# Patient Record
Sex: FEMALE | Race: WHITE | NOT HISPANIC OR LATINO | Employment: FULL TIME | URBAN - METROPOLITAN AREA
[De-identification: names, ages, dates, MRNs, and addresses within clinical notes are randomized per-mention and may not be internally consistent; named-entity substitution may affect disease eponyms.]

---

## 2022-06-29 ENCOUNTER — OFFICE VISIT (OUTPATIENT)
Dept: FAMILY MEDICINE | Facility: CLINIC | Age: 52
End: 2022-06-29
Payer: COMMERCIAL

## 2022-06-29 VITALS
HEART RATE: 60 BPM | TEMPERATURE: 98.1 F | SYSTOLIC BLOOD PRESSURE: 117 MMHG | BODY MASS INDEX: 23.82 KG/M2 | OXYGEN SATURATION: 100 % | HEIGHT: 65 IN | DIASTOLIC BLOOD PRESSURE: 74 MMHG | WEIGHT: 143 LBS

## 2022-06-29 DIAGNOSIS — F41.1 GAD (GENERALIZED ANXIETY DISORDER): Primary | ICD-10-CM

## 2022-06-29 PROCEDURE — 99203 OFFICE O/P NEW LOW 30 MIN: CPT | Performed by: PHYSICIAN ASSISTANT

## 2022-06-29 RX ORDER — CITALOPRAM HYDROBROMIDE 20 MG/1
TABLET ORAL
COMMUNITY
Start: 2021-12-22 | End: 2022-06-29

## 2022-06-29 RX ORDER — CITALOPRAM HYDROBROMIDE 20 MG/1
40 TABLET ORAL DAILY
Qty: 180 TABLET | Refills: 1 | Status: SHIPPED | OUTPATIENT
Start: 2022-06-29 | End: 2022-10-05

## 2022-06-29 ASSESSMENT — ANXIETY QUESTIONNAIRES
4. TROUBLE RELAXING: SEVERAL DAYS
6. BECOMING EASILY ANNOYED OR IRRITABLE: NOT AT ALL
GAD7 TOTAL SCORE: 5
8. IF YOU CHECKED OFF ANY PROBLEMS, HOW DIFFICULT HAVE THESE MADE IT FOR YOU TO DO YOUR WORK, TAKE CARE OF THINGS AT HOME, OR GET ALONG WITH OTHER PEOPLE?: SOMEWHAT DIFFICULT
1. FEELING NERVOUS, ANXIOUS, OR ON EDGE: SEVERAL DAYS
5. BEING SO RESTLESS THAT IT IS HARD TO SIT STILL: NOT AT ALL
7. FEELING AFRAID AS IF SOMETHING AWFUL MIGHT HAPPEN: SEVERAL DAYS
GAD7 TOTAL SCORE: 5
2. NOT BEING ABLE TO STOP OR CONTROL WORRYING: SEVERAL DAYS
3. WORRYING TOO MUCH ABOUT DIFFERENT THINGS: SEVERAL DAYS
7. FEELING AFRAID AS IF SOMETHING AWFUL MIGHT HAPPEN: SEVERAL DAYS
GAD7 TOTAL SCORE: 5

## 2022-06-29 ASSESSMENT — PATIENT HEALTH QUESTIONNAIRE - PHQ9
10. IF YOU CHECKED OFF ANY PROBLEMS, HOW DIFFICULT HAVE THESE PROBLEMS MADE IT FOR YOU TO DO YOUR WORK, TAKE CARE OF THINGS AT HOME, OR GET ALONG WITH OTHER PEOPLE: SOMEWHAT DIFFICULT
SUM OF ALL RESPONSES TO PHQ QUESTIONS 1-9: 7
SUM OF ALL RESPONSES TO PHQ QUESTIONS 1-9: 7

## 2022-06-29 ASSESSMENT — PAIN SCALES - GENERAL: PAINLEVEL: NO PAIN (0)

## 2022-06-29 NOTE — PROGRESS NOTES
Assessment & Plan     TRESSA (generalized anxiety disorder)  Adjust her citalopram dose to 40 mg daily.   She will plan to establish care with a closer clinic within the next 90 days.   Continue with counseling.   - citalopram (CELEXA) 20 MG tablet; Take 2 tablets (40 mg) by mouth daily                 Return in about 3 months (around 9/29/2022) for Physical Exam.    Kristen M. Kehr, PA-C  [facility]    [place]scar   Leonela is a 51 year old, presenting for the following health issues:  Anxiety and Depression    Leonela has been a patient at [facility]. Her insurance changed.   She is going to be establishing care with a provider in [facility], but unable to get an appointment. She will be out of medication. She has been taking citalopram 30 mg daily. She feels it has been a good medication for her, but there has been some increase in symptoms recently with the death in the family and working through marital problems. She is going to counseling with her .   She is considering dose changes with her medication.     History of Present Illness       Mental Health Follow-up:  Patient presents to follow-up on Depression & Anxiety.Patient's depression since last visit has been:  Medium  The patient is not having other symptoms associated with depression.  Patient's anxiety since last visit has been:  Worse  The patient is having other symptoms associated with anxiety.  Any significant life events: relationship concerns and grief or loss  Patient is feeling anxious or having panic attacks.  Patient has no concerns about alcohol or drug use.    She eats 2-3 servings of fruits and vegetables daily.She consumes 1 sweetened beverage(s) daily.She exercises with enough effort to increase her heart rate 20 to 29 minutes per day.  She exercises with enough effort to increase her heart rate 3 or less days per week.   She is taking medications regularly.    Today's PHQ-9         PHQ-9 Total  "Score: 7    PHQ-9 Q9 Thoughts of better off dead/self-harm past 2 weeks :   Not at all    How difficult have these problems made it for you to do your work, take care of things at home, or get along with other people: Somewhat difficult  Today's TRESSA-7 Score: 5             Review of Systems   Constitutional, HEENT, cardiovascular, pulmonary, GI, , musculoskeletal, neuro, skin, endocrine and psych systems are negative, except as otherwise noted.      Objective    /74   Pulse 60   Temp 98.1  F (36.7  C) (Tympanic)   Ht 1.657 m (5' 5.25\")   Wt 64.9 kg (143 lb)   SpO2 100%   BMI 23.61 kg/m    Body mass index is 23.61 kg/m .  Physical Exam   GENERAL: healthy, alert and no distress  PSYCH: mentation appears normal, affect normal/bright and judgement and insight intact                    .  ..  "

## 2022-06-29 NOTE — NURSING NOTE
"Chief Complaint   Patient presents with     Anxiety     Depression       Initial /74   Pulse 60   Temp 98.1  F (36.7  C) (Tympanic)   Ht 1.657 m (5' 5.25\")   Wt 64.9 kg (143 lb)   SpO2 100%   BMI 23.61 kg/m   Estimated body mass index is 23.61 kg/m  as calculated from the following:    Height as of this encounter: 1.657 m (5' 5.25\").    Weight as of this encounter: 64.9 kg (143 lb).  Medication Reconciliation: complete    GONZALES Everett MA    "

## 2022-10-04 PROBLEM — F41.9 ANXIETY: Status: ACTIVE | Noted: 2018-02-02

## 2022-10-04 PROBLEM — L30.9 CHRONIC ECZEMA: Status: ACTIVE | Noted: 2020-06-10

## 2022-10-05 ENCOUNTER — OFFICE VISIT (OUTPATIENT)
Dept: FAMILY MEDICINE | Facility: CLINIC | Age: 52
End: 2022-10-05
Payer: COMMERCIAL

## 2022-10-05 VITALS
SYSTOLIC BLOOD PRESSURE: 126 MMHG | BODY MASS INDEX: 24.62 KG/M2 | DIASTOLIC BLOOD PRESSURE: 68 MMHG | WEIGHT: 147.8 LBS | HEIGHT: 65 IN | HEART RATE: 49 BPM | OXYGEN SATURATION: 100 % | RESPIRATION RATE: 12 BRPM

## 2022-10-05 DIAGNOSIS — Z12.4 CERVICAL CANCER SCREENING: ICD-10-CM

## 2022-10-05 DIAGNOSIS — Z11.59 NEED FOR HEPATITIS C SCREENING TEST: ICD-10-CM

## 2022-10-05 DIAGNOSIS — Z12.5 SCREENING FOR PROSTATE CANCER: ICD-10-CM

## 2022-10-05 DIAGNOSIS — F41.1 GAD (GENERALIZED ANXIETY DISORDER): ICD-10-CM

## 2022-10-05 DIAGNOSIS — Z12.11 SCREEN FOR COLON CANCER: ICD-10-CM

## 2022-10-05 DIAGNOSIS — Z00.00 ANNUAL PHYSICAL EXAM: Primary | ICD-10-CM

## 2022-10-05 DIAGNOSIS — Z11.4 SCREENING FOR HIV (HUMAN IMMUNODEFICIENCY VIRUS): ICD-10-CM

## 2022-10-05 DIAGNOSIS — Z00.00 ROUTINE GENERAL MEDICAL EXAMINATION AT A HEALTH CARE FACILITY: ICD-10-CM

## 2022-10-05 DIAGNOSIS — E04.1 THYROID NODULE: ICD-10-CM

## 2022-10-05 DIAGNOSIS — F41.9 ANXIETY: ICD-10-CM

## 2022-10-05 DIAGNOSIS — Z12.31 VISIT FOR SCREENING MAMMOGRAM: ICD-10-CM

## 2022-10-05 PROCEDURE — 99396 PREV VISIT EST AGE 40-64: CPT | Mod: 25 | Performed by: PHYSICIAN ASSISTANT

## 2022-10-05 PROCEDURE — 99214 OFFICE O/P EST MOD 30 MIN: CPT | Mod: 25 | Performed by: PHYSICIAN ASSISTANT

## 2022-10-05 PROCEDURE — 90471 IMMUNIZATION ADMIN: CPT | Performed by: PHYSICIAN ASSISTANT

## 2022-10-05 PROCEDURE — 90715 TDAP VACCINE 7 YRS/> IM: CPT | Performed by: PHYSICIAN ASSISTANT

## 2022-10-05 RX ORDER — CITALOPRAM HYDROBROMIDE 40 MG/1
40 TABLET ORAL DAILY
Qty: 90 TABLET | Refills: 1 | Status: SHIPPED | OUTPATIENT
Start: 2022-10-05 | End: 2023-06-27

## 2022-10-05 ASSESSMENT — ENCOUNTER SYMPTOMS
PALPITATIONS: 0
HEMATOCHEZIA: 0
COUGH: 0
SORE THROAT: 0
DIARRHEA: 0
ABDOMINAL PAIN: 0
NAUSEA: 0
MYALGIAS: 0
HEMATURIA: 0
JOINT SWELLING: 0
EYE PAIN: 0
FEVER: 0
NERVOUS/ANXIOUS: 0
BREAST MASS: 0
HEARTBURN: 0
WEAKNESS: 0
PARESTHESIAS: 0
SHORTNESS OF BREATH: 0
CHILLS: 0
DYSURIA: 0
DIZZINESS: 0
FREQUENCY: 0
CONSTIPATION: 0
HEADACHES: 0
ARTHRALGIAS: 0

## 2022-10-05 ASSESSMENT — PAIN SCALES - GENERAL: PAINLEVEL: NO PAIN (0)

## 2022-10-05 NOTE — PATIENT INSTRUCTIONS
Please call the radiology dep at [facility] to schedule your test today at [phone]        Preventive Health Recommendations  Female Ages 50 - 64    Yearly exam: See your health care provider every year in order to  Review health changes.   Discuss preventive care.    Review your medicines if your doctor has prescribed any.    Get a Pap test every three years (unless you have an abnormal result and your provider advises testing more often).  If you get Pap tests with HPV test, you only need to test every 5 years, unless you have an abnormal result.   You do not need a Pap test if your uterus was removed (hysterectomy) and you have not had cancer.  You should be tested each year for STDs (sexually transmitted diseases) if you're at risk.   Have a mammogram every 1 to 2 years.  Have a colonoscopy at age 50, or have a yearly FIT test (stool test). These exams screen for colon cancer.    Have a cholesterol test every 5 years, or more often if advised.  Have a diabetes test (fasting glucose) every three years. If you are at risk for diabetes, you should have this test more often.   If you are at risk for osteoporosis (brittle bone disease), think about having a bone density scan (DEXA).    Shots: Get a flu shot each year. Get a tetanus shot every 10 years.    Nutrition:   Eat at least 5 servings of fruits and vegetables each day.  Eat whole-grain bread, whole-wheat pasta and brown rice instead of white grains and rice.  Get adequate Calcium and Vitamin D.     Lifestyle  Exercise at least 150 minutes a week (30 minutes a day, 5 days a week). This will help you control your weight and prevent disease.  Limit alcohol to one drink per day.  No smoking.   Wear sunscreen to prevent skin cancer.   See your dentist every six months for an exam and cleaning.  See your eye doctor every 1 to 2 years.

## 2022-10-10 ENCOUNTER — TELEPHONE (OUTPATIENT)
Dept: FAMILY MEDICINE | Facility: CLINIC | Age: 52
End: 2022-10-10

## 2022-10-10 DIAGNOSIS — L30.9 CHRONIC ECZEMA: Primary | ICD-10-CM

## 2022-10-10 RX ORDER — BETAMETHASONE DIPROPIONATE 0.5 MG/G
CREAM TOPICAL 2 TIMES DAILY
Qty: 45 G | Refills: 1 | Status: SHIPPED | OUTPATIENT
Start: 2022-10-10

## 2022-10-10 NOTE — TELEPHONE ENCOUNTER
Reason for Call:  Other call back    Detailed comments: PT CALLING BACK WITH INFORMATION ON THE CREAM / BETAMETHASONE DIPROPIONATE  05%   WOULD LIKE THIS CALLED INTO HER PHARMACY     Phone Number Patient can be reached at:Cell number on file:    Telephone Information:   Mobile [phone]       Best Time: ANYTIME    Can we leave a detailed message on this number? YES    Call taken on 10/10/2022 at 9:29 AM by Tom Rudd

## 2023-02-12 ENCOUNTER — HEALTH MAINTENANCE LETTER (OUTPATIENT)
Age: 53
End: 2023-02-12

## 2023-06-27 ENCOUNTER — NURSE TRIAGE (OUTPATIENT)
Dept: NURSING | Facility: CLINIC | Age: 53
End: 2023-06-27
Payer: COMMERCIAL

## 2023-06-27 DIAGNOSIS — F41.1 GAD (GENERALIZED ANXIETY DISORDER): ICD-10-CM

## 2023-06-27 NOTE — TELEPHONE ENCOUNTER
Pt calling to request citalopram refill.  Has not contacted her pharmacy.  Separate refill encounter therefore created now.  See separate refill encounter processed per RN refill protocols.    Jenny BUNDY Health Nurse Advisor     Reason for Disposition    Caller requesting a prescription renewal (no refills left), no triage required, and triager able to renew prescription per department policy    Protocols used: MEDICATION REFILL AND RENEWAL CALL-A-OH

## 2023-06-27 NOTE — TELEPHONE ENCOUNTER
"Refill request routed to [facility] since Dr Ybarra was removed from pt's chart as the PCP.  Pt has upcoming appt 7/20/2023 for Med Check w/Dr Infante.  Rx currently depleted.  \"Has been using her son's Rx Celexa supply (same dose).\"    Last Written Prescription Date:  10/5/2022  Last Fill Quantity: 90,  # refills: 1   Last office visit provider:  10/5/2022     Requested Prescriptions   Pending Prescriptions Disp Refills     citalopram (CELEXA) 40 MG tablet 90 tablet 1     Sig: Take 1 tablet (40 mg) by mouth daily       SSRIs Protocol Passed - 6/27/2023  2:51 PM        Passed - Recent (12 mo) or future (30 days) visit within the authorizing provider's specialty     Patient has had an office visit with the authorizing provider or a provider within the authorizing providers department within the previous 12 mos or has a future within next 30 days. See \"Patient Info\" tab in inbasket, or \"Choose Columns\" in Meds & Orders section of the refill encounter.              Passed - Medication is active on med list        Passed - Patient is age 18 or older        Passed - No active pregnancy on record        Passed - No positive pregnancy test in last 12 months             Stephany Marr RN 06/27/23 2:51 PM  "

## 2023-06-28 RX ORDER — CITALOPRAM HYDROBROMIDE 40 MG/1
40 TABLET ORAL DAILY
Qty: 90 TABLET | Refills: 0 | Status: SHIPPED | OUTPATIENT
Start: 2023-06-28 | End: 2023-07-20

## 2023-06-29 DIAGNOSIS — F41.1 GAD (GENERALIZED ANXIETY DISORDER): ICD-10-CM

## 2023-06-29 RX ORDER — CITALOPRAM HYDROBROMIDE 40 MG/1
TABLET ORAL
Qty: 90 TABLET | Refills: 0 | OUTPATIENT
Start: 2023-06-29

## 2023-07-20 ENCOUNTER — VIRTUAL VISIT (OUTPATIENT)
Dept: FAMILY MEDICINE | Facility: CLINIC | Age: 53
End: 2023-07-20
Payer: COMMERCIAL

## 2023-07-20 DIAGNOSIS — F41.1 GAD (GENERALIZED ANXIETY DISORDER): Primary | ICD-10-CM

## 2023-07-20 DIAGNOSIS — Z12.11 SCREEN FOR COLON CANCER: ICD-10-CM

## 2023-07-20 DIAGNOSIS — Z12.31 ENCOUNTER FOR SCREENING MAMMOGRAM FOR BREAST CANCER: ICD-10-CM

## 2023-07-20 PROCEDURE — 99213 OFFICE O/P EST LOW 20 MIN: CPT | Mod: VID | Performed by: FAMILY MEDICINE

## 2023-07-20 RX ORDER — CITALOPRAM HYDROBROMIDE 40 MG/1
40 TABLET ORAL DAILY
Qty: 90 TABLET | Refills: 3 | Status: SHIPPED | OUTPATIENT
Start: 2023-07-20 | End: 2024-08-05

## 2023-07-20 NOTE — PROGRESS NOTES
Leonela is a 52 year old who is being evaluated via a billable video visit.      How would you like to obtain your AVS? MyChart  If the video visit is dropped, the invitation should be resent by: Text to cell phone: [phone]  Will anyone else be joining your video visit? No          Assessment & Plan   Problem List Items Addressed This Visit    None  Visit Diagnoses       TRESSA (generalized anxiety disorder)    -  Primary    Relevant Medications    citalopram (CELEXA) 40 MG tablet    Screen for colon cancer        Relevant Orders    Colonoscopy Screening  Referral    Encounter for screening mammogram for breast cancer        Relevant Orders    *MA Screening Digital Bilateral                          KARINA PITTMAN MD  [facility]    Subjective   Leonela is a 52 year old, presenting for the following health issues:  Recheck Medication        7/20/2023     7:22 AM   Additional Questions   Roomed by allan lester cma     HPI      On Citalopram, overall doing well.  Sleep is okay.  See TRESSA-7      Review of Systems   Constitutional, HEENT, cardiovascular, pulmonary, gi and gu systems are negative, except as otherwise noted.      Objective           Vitals:  No vitals were obtained today due to virtual visit.    Physical Exam   GENERAL: Healthy, alert and no distress  EYES: Eyes grossly normal to inspection.  No discharge or erythema, or obvious scleral/conjunctival abnormalities.  RESP: No audible wheeze, cough, or visible cyanosis.  No visible retractions or increased work of breathing.    SKIN: Visible skin clear. No significant rash, abnormal pigmentation or lesions.  NEURO: Cranial nerves grossly intact.  Mentation and speech appropriate for age.  PSYCH: Mentation appears normal, affect normal/bright, judgement and insight intact, normal speech and appearance well-groomed.                Video-Visit Details    Type of service:  Video Visit   7:37-7:45am    Originating Location (pt.  Location): Home    Distant Location (provider location):  On-site  Platform used for Video Visit: Stephanie

## 2023-10-04 ENCOUNTER — OFFICE VISIT (OUTPATIENT)
Dept: INTERNAL MEDICINE | Facility: CLINIC | Age: 53
End: 2023-10-04
Payer: COMMERCIAL

## 2023-10-04 ENCOUNTER — NURSE TRIAGE (OUTPATIENT)
Dept: NURSING | Facility: CLINIC | Age: 53
End: 2023-10-04

## 2023-10-04 VITALS
TEMPERATURE: 98.1 F | BODY MASS INDEX: 25.83 KG/M2 | SYSTOLIC BLOOD PRESSURE: 122 MMHG | HEART RATE: 66 BPM | OXYGEN SATURATION: 100 % | RESPIRATION RATE: 14 BRPM | HEIGHT: 65 IN | DIASTOLIC BLOOD PRESSURE: 76 MMHG | WEIGHT: 155 LBS

## 2023-10-04 DIAGNOSIS — G43.109 MIGRAINE WITH AURA AND WITHOUT STATUS MIGRAINOSUS, NOT INTRACTABLE: Primary | ICD-10-CM

## 2023-10-04 PROCEDURE — 99214 OFFICE O/P EST MOD 30 MIN: CPT | Performed by: INTERNAL MEDICINE

## 2023-10-04 RX ORDER — SUMATRIPTAN 25 MG/1
25 TABLET, FILM COATED ORAL
Qty: 30 TABLET | Refills: 1 | Status: SHIPPED | OUTPATIENT
Start: 2023-10-04

## 2023-10-04 RX ORDER — NAPROXEN 500 MG/1
500 TABLET ORAL DAILY PRN
Qty: 30 TABLET | Refills: 1 | Status: SHIPPED | OUTPATIENT
Start: 2023-10-04

## 2023-10-04 NOTE — PROGRESS NOTES
Office Visit - Follow Up   Leonela Jaramillo   53 year old female    Date of Visit: 10/4/2023    Chief Complaint   Patient presents with    Migraine     Vision change and headache.         -------------------------------------------------------------------------------------------------------------------------  Assessment and Plan    Acute symptom visit  Ms. Jaramillo is a historically quite healthy 53-year-old woman who works as a .  She comes in today because of    Migraine headaches with visual aura, similar to what she experienced as a teenager, and then symptoms went away for many years but have recurred in the last week, 3 times since last Tuesday, September 26.    These 3 instances over the past week have occurred during the day when she was at work.  Symptoms started with visual distortion, seeing jagged lines, and and maybe some loss of visual field to the right, and then a few minutes later a dull headache over the right temporoparietal area.  Symptoms were not disabling, and Ms. Jaramillo went on to finish the workday, and then would have supper and go to sleep, and then the next day would feel better.    Other than the visual aura, there have been no other neurologic deficits.  Specifically no problems with speech, movement, coordination, cognition,  balance, any lateralizing weakness or stroke or TIA-like symptoms    She recalls that this symptom is similar if not identical to what she experienced when she was 19 or 20 years old.  In her 20s and really for most of her adult life, she was not bothered by these migrainous symptoms.  Maybe there was an incident about 5 years ago.    Her general health has been good.  I am not sure why she should have a return of migraine symptoms at age 53.  She recalls basically completing menopause in her late 40s.  She is on no new medications.  Caffeine consumption has been pretty steady at two 17 ounce bottles of Diet Coke per day.    On examination today  October 4, 2023, mental status totally normal.  Facial movement symmetrical.  Pupils equal and reactive.  Finger-nose testing normal.  Able to balance on 1 foot.  Smooth gait.    I do not think we need any neuroimaging at this time.  I would like to go ahead and treat her with for migraine with visual aura, abortive therapy, using  Start with low-dose sumatriptan and 25 mg tablet, may repeat after 2 hours, and then again 2 hours later.  I probably limit her to 4 tablets in 24 hours.  If she finds that that does not give her relief, then would pursue further evaluation before escalating the dose.  Naproxen 500 mg tablet, take with the first dose of sumatriptan and on any day having symptoms.  She does take citalopram for history of depression.  I told her that there is potential interaction between citalopram and naproxen, possibly with increased risk of gastrointestinal bleeding.  So she will be on the look out for stomach pain type symptoms, but I think that is highly unlikely since she will be using the naproxen very intermittently.    I would recommend that she get another annual general checkup, to make sure her general health is okay, including checking thyroid status, comprehensive metabolic panel including blood sugar for his diabetes screening.  Her blood pressure does look fine.    I also recommended that she get a comprehensive eye exam.          --------------------------------------------------------------------------------------------------------------------------  History of Present Illness  This 53 year old old     Reason for visit:  Migraine symptoms  Symptom onset:  1-2 weeks ago    These 3 instances over the past week have occurred during the day when she was at work.  Symptoms started with visual distortion, seeing jagged lines, and and maybe some loss of visual field to the right, and then a few minutes later a dull headache over the right temporoparietal area.  Symptoms were not disabling, and  Ms. Jaramillo went on to finish the workday, and then would have supper and go to sleep, and then the next day would feel better.    Other than the visual aura, there have been no other neurologic deficits.  Specifically no problems with speech, movement, coordination, cognition,  balance, any lateralizing weakness or stroke or TIA-like symptoms    She recalls that this symptom is similar if not identical to what she experienced when she was 19 or 20 years old.  In her 20s and really for most of her adult life, she was not bothered by these migrainous symptoms.  Maybe there was an incident about 5 years ago.    Her general health has been good.  I am not sure why she should have a return of migraine symptoms at age 53.  She recalls basically completing menopause in her late 40s.  She is on no new medications.  Caffeine consumption has been pretty steady at two 17 ounce bottles of Diet Coke per day.      She eats 2-3 servings of fruits and vegetables daily.She consumes 0 sweetened beverage(s) daily.She exercises with enough effort to increase her heart rate 9 or less minutes per day.  She exercises with enough effort to increase her heart rate 3 or less days per week.   She is taking medications regularly.     Wt Readings from Last 3 Encounters:   10/04/23 70.3 kg (155 lb)   10/05/22 67 kg (147 lb 12.8 oz)   06/29/22 64.9 kg (143 lb)     BP Readings from Last 3 Encounters:   10/04/23 122/76   10/05/22 126/68   06/29/22 117/74       No results found for: WBC, HGB, HCT, PLT, CHOL, TRIG, HDL, LDLDIRECT, ALT, AST, NA, CREATININE, BUN, CO2, TSH, PSA, INR, GLUF, HGBA1C, MICROALBUR     Review of Systems: A comprehensive review of systems was negative except as noted.  ---------------------------------------------------------------------------------------------------------------------------    Medications, Allergies, Social, and Problem List       Current Outpatient Medications   Medication Sig Dispense Refill    betamethasone  "dipropionate (DIPROSONE) 0.05 % external cream Apply topically 2 times daily 45 g 1    citalopram (CELEXA) 40 MG tablet Take 1 tablet (40 mg) by mouth daily 90 tablet 3     Allergies   Allergen Reactions    Vancomycin Hives     Social History     Tobacco Use    Smoking status: Never    Smokeless tobacco: Never   Vaping Use    Vaping Use: Never used   Substance Use Topics    Alcohol use: Not Currently    Drug use: Never     Patient Active Problem List   Diagnosis    Anxiety    Chronic eczema        Reviewed, reconciled and updated       Physical Exam   General Appearance:       /76   Pulse 66   Temp 98.1  F (36.7  C)   Resp 14   Ht 1.651 m (5' 5\")   Wt 70.3 kg (155 lb)   LMP  (LMP Unknown)   SpO2 100%   BMI 25.79 kg/m      On examination today October 4, 2023, mental status totally normal. Facial movement symmetrical. Pupils equal and reactive. Finger-nose testing normal. Able to balance on 1 foot. Smooth gait.      Additional Information        YESY MATHIS MD, MD       "

## 2023-10-04 NOTE — TELEPHONE ENCOUNTER
"Nurse Triage SBAR    Is this a 2nd Level Triage? YES, LICENSED PRACTITIONER REVIEW IS REQUIRED    Situation: vision loss with headache/migraine    Background: History of migraines.  Worse migraine ever was when she was 18 or 19 years old.  Patient doesn't wear glasses or contacts.    Assessment: Patient has been having what she calls  \"mini migraines\" with vision changes, on and off since Tuesday, 9/26/23.  Patient states when she gets these episodes, her vision changes and she can only see half of someone's face.  States vision loss lasts for a few minutes.  Last migraine was yesterday, 10/3/23, with pain rating 5/10 and vision change lasted less than a minute.  Today she complains 2/10 \"pressure headache\" but no vision changes today.  Patient is more concerned with the vision changes than the migraines.    Protocol Recommended Disposition:   Go To Office Now    Recommendation: Transferred patient to the scheduling line and an appointment was open with Dr. Giordano at [facility] today at 5:10 and patient was going to see him.         Does the patient meet one of the following criteria for ADS visit consideration? 16+ years old, with an [facility] PCP     TIP  Providers, please consider if this condition is appropriate for management at one of our Acute and Diagnostic Services sites.     If patient is a good candidate, please use dotphrase <dot>triageresponse and select Refer to ADS to document.    Reason for Disposition   Eye pain and brief (now gone) blurred vision or visual changes    Additional Information   Negative: Difficult to awaken or acting confused (e.g., disoriented, slurred speech)   Negative: Weakness of the face, arm or leg on one side of the body and new-onset   Negative: Numbness of the face, arm or leg on one side of the body and new-onset   Negative: Loss of speech or garbled speech and new-onset   Negative: Passed out (i.e., fainted, collapsed and was not responding)   Negative: Sounds like a " "life-threatening emergency to the triager   Negative: Unable to walk without falling   Negative: Stiff neck (can't touch chin to chest)   Negative: Possibility of carbon monoxide exposure   Negative: SEVERE headache, states 'worst headache' of life   Negative: SEVERE headache, sudden-onset (i.e., reaching maximum intensity within seconds to 1 hour)   Negative: Severe pain in one eye   Negative: Loss of vision or double vision  (Exception: Same as prior migraines.)   Negative: Patient sounds very sick or weak to the triager   Negative: Fever > 103 F (39.4 C)   Negative: Fever > 100.0 F (37.8 C) and has diabetes mellitus or a weak immune system (e.g., HIV positive, cancer chemotherapy, organ transplant, splenectomy, chronic steroids)   Negative: SEVERE headache (e.g., excruciating) and has had severe headaches before   Negative: SEVERE headache and not relieved by pain meds   Negative: SEVERE headache and vomiting   Negative: SEVERE headache and fever   Negative: Complete loss of vision in one or both eyes   Negative: SEVERE eye pain   Negative: SEVERE headache   Negative: Double vision   Negative: Blurred vision or visual changes and present now and sudden onset or new (e.g., minutes, hours, days)  (Exception: Seeing floaters / black specks OR previously diagnosed migraine headaches with same symptoms.)   Negative: Patient sounds very sick or weak to the triager   Negative: Flashes of light  (Exception: Brief from pressing on the eyeball.)   Negative: Many floaters in the eye (Exception: Floater(s) are a chronic symptom and this is unchanged from patient's baseline pattern.)     May have had a \"hair\" go floating by in the past week but not now.   Negative: Followed a head injury within last 3 days   Negative: Traumatic Brain Injury (TBI) is suspected   Negative: Sinus pain of forehead and yellow or green nasal discharge   Negative: Pregnant   Negative: New headache and weak immune system (e.g., HIV positive, cancer " chemo, splenectomy, organ transplant, chronic steroids)   Negative: Fever present > 3 days (72 hours)    Protocols used: Headache-A-OH, Vision Loss or Change-A-OH

## 2023-10-04 NOTE — PATIENT INSTRUCTIONS
Acute symptom visit  Ms. Jaramillo is a historically quite healthy 53-year-old woman who works as a .  She comes in today because of    Migraine headaches with visual aura, similar to what she experienced as a teenager, and then symptoms went away for many years but have recurred in the last week, 3 times since last Tuesday, September 26.    These 3 instances over the past week have occurred during the day when she was at work.  Symptoms started with visual distortion, seeing jagged lines, and and maybe some loss of visual field to the right, and then a few minutes later a dull headache over the right temporoparietal area.  Symptoms were not disabling, and Ms. Jaramillo went on to finish the workday, and then would have supper and go to sleep, and then the next day would feel better.    Other than the visual aura, there have been no other neurologic deficits.  Specifically no problems with speech, movement, coordination, cognition,  balance, any lateralizing weakness or stroke or TIA-like symptoms    She recalls that this symptom is similar if not identical to what she experienced when she was 19 or 20 years old.  In her 20s and really for most of her adult life, she was not bothered by these migrainous symptoms.  Maybe there was an incident about 5 years ago.    Her general health has been good.  I am not sure why she should have a return of migraine symptoms at age 53.  She recalls basically completing menopause in her late 40s.  She is on no new medications.  Caffeine consumption has been pretty steady at two 17 ounce bottles of Diet Coke per day.    On examination today October 4, 2023, mental status totally normal.  Facial movement symmetrical.  Pupils equal and reactive.  Finger-nose testing normal.  Able to balance on 1 foot.  Smooth gait.    I do not think we need any neuroimaging at this time.  I would like to go ahead and treat her with for migraine with visual aura, abortive therapy,  using  Start with low-dose sumatriptan and 25 mg tablet, may repeat after 2 hours, and then again 2 hours later.  I probably limit her to 4 tablets in 24 hours.  If she finds that that does not give her relief, then would pursue further evaluation before escalating the dose.  Naproxen 500 mg tablet, take with the first dose of sumatriptan and on any day having symptoms.  She does take citalopram for history of depression.  I told her that there is potential interaction between citalopram and naproxen, possibly with increased risk of gastrointestinal bleeding.  So she will be on the look out for stomach pain type symptoms, but I think that is highly unlikely since she will be using the naproxen very intermittently.    I would recommend that she get another annual general checkup, to make sure her general health is okay, including checking thyroid status, comprehensive metabolic panel including blood sugar for his diabetes screening.  Her blood pressure does look fine.

## 2023-10-13 ENCOUNTER — TELEPHONE (OUTPATIENT)
Dept: FAMILY MEDICINE | Facility: CLINIC | Age: 53
End: 2023-10-13
Payer: COMMERCIAL

## 2023-10-19 ENCOUNTER — LAB (OUTPATIENT)
Dept: LAB | Facility: CLINIC | Age: 53
End: 2023-10-19
Payer: COMMERCIAL

## 2023-10-19 DIAGNOSIS — Z11.59 NEED FOR HEPATITIS C SCREENING TEST: ICD-10-CM

## 2023-10-19 DIAGNOSIS — Z11.4 SCREENING FOR HIV (HUMAN IMMUNODEFICIENCY VIRUS): Primary | ICD-10-CM

## 2023-10-19 LAB
CHOLEST SERPL-MCNC: 218 MG/DL
HDLC SERPL-MCNC: 68 MG/DL
LDLC SERPL CALC-MCNC: 135 MG/DL
NONHDLC SERPL-MCNC: 150 MG/DL
TRIGL SERPL-MCNC: 77 MG/DL

## 2023-10-19 PROCEDURE — 86803 HEPATITIS C AB TEST: CPT

## 2023-10-19 PROCEDURE — 87389 HIV-1 AG W/HIV-1&-2 AB AG IA: CPT

## 2023-10-19 PROCEDURE — 36415 COLL VENOUS BLD VENIPUNCTURE: CPT

## 2023-10-19 PROCEDURE — 80061 LIPID PANEL: CPT

## 2023-10-20 LAB
HCV AB SERPL QL IA: NONREACTIVE
HIV 1+2 AB+HIV1 P24 AG SERPL QL IA: NONREACTIVE

## 2023-12-31 ENCOUNTER — HEALTH MAINTENANCE LETTER (OUTPATIENT)
Age: 53
End: 2023-12-31

## 2024-03-04 ENCOUNTER — HOSPITAL ENCOUNTER (OUTPATIENT)
Dept: MAMMOGRAPHY | Facility: CLINIC | Age: 54
Discharge: HOME | End: 2024-03-04
Attending: FAMILY MEDICINE | Admitting: FAMILY MEDICINE
Payer: COMMERCIAL

## 2024-03-04 DIAGNOSIS — Z12.31 ENCOUNTER FOR SCREENING MAMMOGRAM FOR BREAST CANCER: ICD-10-CM

## 2024-03-04 PROCEDURE — 77063 BREAST TOMOSYNTHESIS BI: CPT

## 2024-05-08 ENCOUNTER — HOSPITAL ENCOUNTER (OUTPATIENT)
Facility: CLINIC | Age: 54
End: 2024-05-08
Attending: INTERNAL MEDICINE | Admitting: INTERNAL MEDICINE
Payer: COMMERCIAL

## 2024-05-08 ENCOUNTER — TELEPHONE (OUTPATIENT)
Dept: GASTROENTEROLOGY | Facility: CLINIC | Age: 54
End: 2024-05-08
Payer: COMMERCIAL

## 2024-05-08 NOTE — TELEPHONE ENCOUNTER
Attempted to contact patient in order to complete pre assessment questions.     No answer. Left message to return call to [phone] option 4    Callback communication sent via Canary.    Marie Romeo RN

## 2024-05-08 NOTE — LETTER
May 10, 2024      Leonela Jaramillo  [address] [place] [id number]              Dear Leonela,        We apologize that we have been unable to contact you by phone. We are calling in regards to your upcoming Colonoscopy procedure that is scheduled on 5/15/24 to complete pre assessment.    Please contact our pre assessment nursing team at [phone] option 4, as soon as possible. We are open Monday through Friday, 7:00am to 5:00pm. Note that failure to complete Nurse assessment phone call will result in your procedure being cancelled.     Our schedules fill up quickly and are in high demand. If you know that you need to cancel, please contact us so that we can accommodate scheduling for other patients. Our endoscopy scheduling team can be reached at [phone] option 2.           Thank you,  [facility] Endoscopy Team                          Colonoscopy      Procedure date: 5/15/24    Anticipated arrival time: 12:15 PM   (Please note that arrival times may change)    Facility location: [facility]; [address] [place] [id number] - Check in location: 1st Floor [facility]. Parking information: Self pay parking available in [facility]. The [facility] is  located across [facility] from the [facility] and is connected to the  hospital by a skyway. The skyway access is on Level C of the parking ramp.   parking is available Monday through Friday from 7 a.m.-3 p.m.  parking attendants are stationed at Door 2 at the [facility] [place] entrance,  located off of [address]    Important Procedure Reminders:     Prep Instructions:   Instructions on how to prepare for your upcoming procedure are found below. Please read instructions carefully. Deviation from instructions may result in less than desired outcomes and procedure may need to be rescheduled.   If you have additional questions regarding how to prepare for your upcoming procedure, contact our endoscopy pre assessment nurses at  [phone] option 4 Monday through Friday 7:00am-5:00pm     Policy:   The medications used during the procedure will make you sleepy, so you won't be able to drive. On the day of your procedure, please have an adult ready to drive you home and stay with you for the next 6 hours.   You can't use public transportation, ride-share services, or non-medical taxi services without a responsible caregiver. Medical transport services are okay, but a caregiver must be there to receive you at your destination.   Make sure your  and caregiver are confirmed before your procedure.    Day of procedure:  Please keep in mind that arrival times may change. Let your  know there might be a one-hour window for changes.  We ask that you please check in at the  with your . Your  should remain on campus.  Expect to be at the procedure center for about 1.5-2.5 hours.    Please do not wear jewelry (i.e. earrings, rings, necklaces, watches, etc). Leave your purse, billfold, credit cards, and other valuables at home.   Bring insurance card and ID.     To cancel or reschedule your procedure:   Within 14 days of your procedure if you develop any flu-like symptoms (such as fever, cough, shortness of breath), COVID-19 like symptoms or exposure to COVID-19, contact our endoscopy team at [phone] option 4 to determine if procedure can be completed or needs to be delayed.   If you need to cancel or reschedule, our endoscopy scheduling team can be reached at [phone], option 2. Monday through Friday, 7:00am-5:00pm.    Medication Reminders:    Please note the following medication holding recommendations:   NSAID medication(s): Naproxen (Aleve, Naprosyn): HOLD 4 days before procedure.      ----------------------------------------------------------------------------------------------------------------------------------------------------------------------------------------------------------------------------------------------------------------------            Standard MiraLAX Bowel Prep  Prep Instructions for your Colonoscopy  Pre-Assessment Phone Number: [facility]; [phone] option 4      Please read these instructions carefully at least 7 days prior to your colonoscopy procedure. Be sure to follow all directions completely. The inside of your colon must be clean to allow for a complete examination for the presence of any growths, polyps, and/or abnormalities, as well as their biopsy or removal. A number of tips are included in order to make this part of the procedure as comfortable as possible.      Getting ready:   A nurse will call you to go over instructions and your health history.  It's important to complete the nurse assessment before your procedure. If you don't, your procedure might have to be canceled.  You must arrange for an adult to drive you home after your exam. Your colonoscopy cannot be done unless you have a ride. If you need to use public transportation, someone must ride with you and stay with you for a minimum of 24 hours.  Check with your insurance company to be sure they will cover this exam.  Go to the drug store and buy:  Four (4) - Dulcolax (Bisacodyl) 5mg tablets   8.3 ounce bottle of Miralax powder  64 ounces of Gatorade (not red or purple)     10 ounce bottle of clear magnesium citrate    7 days before the exam:  Talk to your prescribing provider: If you take blood thinners (such as Coumadin, Plavix, Xarelto, Eliquis, Lovenox, or others), these medications may need to be stopped temporarily before your procedure. Your prescribing provider will tell you what to do.   Talk to your prescribing provider: If you take prescription NSAIDS (such as Sulindac,  Celebrex, Mobic, Relafen). Your prescribing provider will tell you what to do.   Stop taking fiber and iron supplements   Stop eating corn, popcorn, nuts and foods that contain seeds. These can stay in the colon for many days and they can clog up the colonoscope.     3 days before the exam:  Begin a low-fiber diet (see below).   Drink at least 4 to 6 large glasses of water or sports drink (not red or purple) each day.     One day before the exam:  Only clear liquid diet is allowed (see below). No solid food should be eaten.   Drink at least 8 to 10 full glasses of clear liquids during the day.   Stop taking NSAID pain relievers, such as Advil, Ibuprofen, Motrin, etc.  You may take Tylenol.  Note: You will start drinking the colonoscopy prep solution at 5 PM. The timing of second step depends on your appointment arrival time. See Steps 1-2 below.    Step One:  At 4 PM, take 2 Dulcolax (Bisacodyl) tablets.   At 4 PM, mix the entire bottle of Miralax with 64 ounces of Gatorade in a pitcher and stir to dissolve the powder. Chill if desired, but do not add ice.   At 5 PM, start drinking an 8-ounce glass of the Miralax and Gatorade mixture every 15 minutes until the pitcher is HALF empty (about 4 glasses).  Store the rest in the refrigerator.   Bowel movements usually begin about one hour after your finish this first dose of Miralax. The stool is likely to be brown at this stage.   After you start drinking the solution, stay near a toilet. You may have watery stools (diarrhea), mild cramping, bloating , and nausea.   You may want to use Vaseline on the skin around your anus after each bowel movement to prevent irritation. You can also use wet wipes to prevent irritation.  Continue to drink clear liquids.     At 10 PM, take 2 Dulcolax (Bisacodyl) tablets  At 10 PM start drinking an 8-ounce glass of Miralax and Gatorade mixture every 15 minutes until the pitcher is empty (about 4 glasses).       Step Two:    If you arrive  for your procedure after 11 AM:     At 6 AM on the day of the exam: drink 10 ounces of clear Magnesium Citrate        Day of exam:  You may drink clear liquids only up until 2 hours before your arrival time.  You may take your necessary morning medications with sips of water  Do not take diabetes medicine by mouth until after your exam.  Do not smoke or swallow anything, including water or gum for at least 2 hours before your arrival time. This is a safety issue. Your procedure could be cancelled if you do not follow directions.  No chewing tobacco 6 hours prior to procedure arrival time.   Please do not wear jewelry (i.e. earrings, rings, necklaces, watches, etc) . Leave your purse, billfold, credit cards, and other valuables at home.    Please arrive with a responsible adult who can take you home after the test and stay with you for a minimum of 24 hours: The medicine used will make you sleepy and forgetful. If you do not have someone to take you home, we will cancel your procedure. If using public transportation you must have someone to ride with you.  Please perform your nebulizer treatments and airway clearance therapy in the morning prior to the procedure (if applicable).  If you have asthma, bring your inhalers.       CLEAR LIQUID DIET   You may have:    Water, tea, coffee (no milk or cream)  Soda pop, Gatorade (not red or purple)  Jell-O, Popsicles (no milk or fruit pieces - not red or purple)  Fat-free soup broth or bouillon  Plain hard candy, such as clear life savers (not red or purple)  Clear juices and fruit-flavored drinks, such as apple juice, white grape juice, Hi-C, and Luther-Aid (not red or purple)   Do not have:    Milk or milk products such as ice cream, malts or shakes, or coffee creamer  Red or purple drinks of any kind such as cranberry juice or grape juice. Avoid red or purple Jell-O, Popsicles, Luther-Aid, sorbet, sherbet and candy  Juices with pulp such as orange, grapefruit, pineapple or  tomato juice  Cream soups of any kind  Alcohol and beer  Protein drinks or protein powder           LOW FIBER DIET   You may have:    Starches: White bread, rolls, biscuits, croissants, [place] toast, white flour tortillas, waffles, pancakes, Albanian toast; white rice, noodles, pasta, macaroni; cooked and peeled potatoes; plain crackers, saltines; cooked farina or cream of rice; puffed rice, corn flakes, Rice Krispies, Special K   Vegetables: tender cooked and canned, vegetable broths  Fruits and fruit juices: Strained fruit juice, canned fruit without seeds or skin (not pineapple), applesauce, pear sauce, ripe bananas, melons (not watermelon)   Milk products: Milk (plain or flavored), cheese, cottage cheese, yogurt (no berries), custard, ice cream    Proteins: Tender, well-cooked ground beef, lamb, veal, ham, pork, chicken, turkey, fish or organ meats; eggs; creamy peanut butter   Fats and condiments:  Margarine, butter, oils, mayonnaise, sour cream, salad dressing, plain gravy; spices, cooked herbs; sugar, clear jelly, honey, syrup   Snacks, sweets and drinks: Pretzels, hard candy; plain cakes and cookies (no nuts or seeds); gelatin, plain pudding, sherbet, Popsicles; coffee, tea, carbonated ( fizzy ) drinks Do not have:    Starches: Breads or rolls that contain nuts, seeds or fruit; whole wheat or whole grain breads that contain more than 1 gram of fiber per slice; cornbread; corn or whole wheat tortillas; potatoes with skin; brown rice, wild rice, kasha (buckwheat), and oatmeal   Vegetables: Any raw or steamed vegetables; vegetables with seeds; corn in any form   Fruits and fruit juices: Prunes, prune juice, raisins and other dried fruits, berries and other fruits with seeds, canned pineapple juices with pulp such as orange, grapefruit, pineapple or tomato juice  Milk products: Any yogurt with nuts, seeds or berries   Proteins: Tough, fibrous meats with gristle; cooked dried beans, peas or lentils; crunchy peanut  butter  Fats and condiments: Pickles, olives, relish, horseradish; jam, marmalade, preserves   Snacks, sweets and drinks: Popcorn, nuts, seeds, granola, coconut, candies made with nuts or seeds; all desserts that contain nuts, seeds, raisins and other dried fruits, coconut, whole grains or bran.          FAQ:    Why should Miralax be mixed with Gatorade or another clear sports drink?   It is important that your body does not develop an imbalance of electrolytes with the large volume of fluid in this prep. Gatorade/sports drinks contains those electrolytes.   Does Miralax have to be mixed with Gatorade?  Gatorade, Powerade, or any of the other sports drinks are acceptable. If you are diabetic, it is acceptable to use the low sugar options, such as Gatorade Zero, Powerade Zero, G2, etc.  Can I put ice in the Gatorade/Miralax mixture?  We prefer that you mix it and put it in the refrigerator to chill instead of using ice. The ice will melt and dilute the laxative.    Why should the Miralax prep be taken in several steps?   The stool is flushed out by a large wave of fluid going through the colon. Just sipping a large volume of the solution will not achieve the desired result. Studies have shown that two smaller waves (or more in some cases) are better than one large one.    Why are the second Miralax dose and Magnesium Citrate so close to the exam?   The intestine continues to produce mucus and waste. Longer intervals between the prep and the exam can lead to less than desired results. However, the stomach must be empty at the time of the exam in order to allow safe sedation. Therefore, there should be nothing by mouth 2 hours before the exam is started.   How do you know if your colon is cleaned out?   After completing the bowel prep, your bowel movements should be all liquid and yellow. Your bowel movements will look similar to urine in the toilet. If there are pieces of stool (poop) in the toilet, or if you can't  see to the bottom of the toilet, please call our office for advice. Call [phone] and ask to speak with a nurse.   Why do you need a responsible  to take you home and stay with you?  We require a responsible adult to take you home for your safety. The sedation medicines used to relax you during the procedure can impair your judgement and reaction time, and make you forgetful and possibly a little unsteady. Do not drive, make any important decisions, or sign any legal documents for 24 hours after your procedure.   It is normal to feel bloated and gassy after your procedure. Walking will help move the air through your colon. You can take non-aspirin pain relievers that contain acetaminophen (Tylenol).     When can you eat after your procedure?  You may resume your normal diet when you feel ready, unless advised otherwise by the doctor performing your procedure. Do not drink alcohol for 24 hours after your procedure.   You many resume normal activities (work, exercise, etc.) after 24 hours.     When will you get test results?  You should have your procedure results and any lab results (if applicable) by letter, MyChart message, or phone call within 2 weeks. If you have any questions, please call the doctor that referred you for the procedure.         Updated: 06/22/2022

## 2024-05-08 NOTE — TELEPHONE ENCOUNTER
"Endoscopy Scheduling Screen    Have you had a positive Covid test in the last 14 days?  No    What is your communication preference for Instructions and/or Bowel Prep?   MyChart    What insurance is in the chart?  Other:  bcbs     Ordering/Referring Provider:     KARINA PITTMAN       (If ordering provider performs procedure, schedule with ordering provider unless otherwise instructed. )    BMI: Estimated body mass index is 25.79 kg/m  as calculated from the following:    Height as of 10/4/23: 1.651 m (5' 5\").    Weight as of 10/4/23: 70.3 kg (155 lb).     Sedation Ordered  moderate sedation.   If patient BMI > 50 do not schedule in [facility].    If patient BMI > 45 do not schedule at [facility].    Are you taking methadone or Suboxone?  No    Have you had difficulties, pain, or discomfort during past endoscopy procedures?  No    Are you taking any prescription medications for pain 3 or more times per week?   NO, No RN review required.    Do you have a history of malignant hyperthermia?  No    (Females) Are you currently pregnant?   No     Have you been diagnosed or told you have pulmonary hypertension?   No    Do you have an LVAD?  No    Have you been told you have moderate to severe sleep apnea?  No    Have you been told you have COPD, asthma, or any other lung disease?  Yes     What breathing problems do you have?  Asthma     Do you use home oxygen?  No    Have your breathing problems required an ED visit or hospitalization in the last year?  No    Do you have any heart conditions?  No  - heart mur-mur     Have you ever had or are you waiting for an organ transplant?  No. Continue scheduling, no site restrictions.    Have you had a stroke or transient ischemic attack (TIA aka \"mini stroke\" in the last 6 months?   No    Have you been diagnosed with or been told you have cirrhosis of the liver?   No    Are you currently on dialysis?   No    Do you need assistance transferring?   No    BMI: Estimated body mass index is 25.79 " "kg/m  as calculated from the following:    Height as of 10/4/23: 1.651 m (5' 5\").    Weight as of 10/4/23: 70.3 kg (155 lb).     Is patients BMI > 40 and scheduling location [facility]?  No    Do you take an injectable medication for weight loss or diabetes (excluding insulin)?  No    Do you take the medication Naltrexone?  No    Do you take blood thinners?  No       Prep   Are you currently on dialysis or do you have chronic kidney disease?  No    Do you have a diagnosis of diabetes?  No    Do you have a diagnosis of cystic fibrosis (CF)?  No    On a regular basis do you go 3 -5 days between bowel movements?  No    BMI > 40?  No    Preferred Pharmacy:    [facility] [id number] [place] [address] [place] [[id number]]       Final Scheduling Details     Procedure scheduled  Colonoscopy    Surgeon:  Trevor      Date of procedure:  05/15/2024     Pre-OP / PAC:   No - Not required for this site.    Location  [facility] - Per order.    Sedation   Moderate Sedation - Per order.      Patient Reminders:   You will receive a call from a Nurse to review instructions and health history.  This assessment must be completed prior to your procedure.  Failure to complete the Nurse assessment may result in the procedure being cancelled.      On the day of your procedure, please designate an adult(s) who can drive you home stay with you for the next 24 hours. The medicines used in the exam will make you sleepy. You will not be able to drive.      You cannot take public transportation, ride share services, or non-medical taxi service without a responsible caregiver.  Medical transport services are allowed with the requirement that a responsible caregiver will receive you at your destination.  We require that drivers and caregivers are confirmed prior to your procedure.  "

## 2024-05-08 NOTE — TELEPHONE ENCOUNTER
Pre visit planning completed.      Procedure details:    Patient scheduled for Colonoscopy  on 5.15.2024.     Arrival time: 1215. Procedure time 1300    Facility location: [facility]; [address] [place] [address] [place] [address]. Check in location: 1st Floor [facility].     Sedation type: Conscious sedation     Pre op exam needed? N/A    Indication for procedure: screening colonoscopy      Chart review:     Electronic implanted devices? No    Recent diagnosis of diverticulitis within the last 6 weeks? No    Diabetic? No      Medication review:    Anticoagulants? No    NSAIDS? Yes.  Naproxen (Naprosyn, Aleve).  Holding interval of 4 days.    Other medication HOLDING recommendations:  N/A      Prep for procedure:     Bowel prep recommendation: Standard Miralax  Due to: standard bowel prep.    Prep instructions sent via [employer]         Marie Romeo RN  Endoscopy Procedure Pre Assessment RN  [phone] option 4    Addendum in red.  Naproxen per med list.  Michelle Vance RN on 5/10/2024 at 9:33 AM

## 2024-05-10 NOTE — TELEPHONE ENCOUNTER
Second call attempt to complete pre assessment.     No answer.  Left message to return call to [phone] #4 by next business day prior to 4PM or procedure will be sent to cancel.     Callback required communication sent via [employer] and letter.  No [employer] login since 7/20/23. Also sent prep instructions in letter via mail.      Michelle Vance RN  Endoscopy Procedure Pre Assessment RN

## 2024-05-13 ENCOUNTER — TELEPHONE (OUTPATIENT)
Dept: GASTROENTEROLOGY | Facility: CLINIC | Age: 54
End: 2024-05-13
Payer: COMMERCIAL

## 2024-05-13 NOTE — TELEPHONE ENCOUNTER
Caller: Leonela Jaramillo      Reason for Reschedule/Cancellation   (please be detailed, any staff messages or encounters to note?): schedule conflict      Prior to reschedule please review:  Ordering Provider: KARINA PITTMAN  Sedation Determined: CS  Does patient have any ASC Exclusions, please identify?: NO      Notes on Cancelled Procedure:  Procedure: Lower Endoscopy [Colonoscopy]   Date: 5/15  Location: [facility]; [address] [place] [address]   Surgeon: NELY      Rescheduled: Yes,   Procedure: Lower Endoscopy [Colonoscopy]    Date: 5/23   Location: [facility] [place]; [address], [place] [address]    Surgeon: GALDINO   Sedation Level Scheduled  CS ,  Reason for Sedation Level ORDER   Instructions updated and sent: Y     Does patient need PAC or Pre -Op Rescheduled? : N       Did you cancel or rescheduled an EUS procedure? No.

## 2024-05-14 NOTE — TELEPHONE ENCOUNTER
Rescheduled procedure    Patient scheduled for Colonoscopy  on 5.23.24.    Arrival time: 0645. Procedure time 0730    Pre op exam needed? N/A    Facility location: [facility] [place]; [address] Floor, [place] [address]    Sedation type: Conscious sedation     Pre-Assessment was NOT completed for previously scheduled procedure. (See documentation below).      Resent prep instructions via [employer] [facility] [employer]. And US mail    Left a message for Patient  to return call to pre-assessment team at [phone] option 4 if they have any questions regarding rescheduled procedure and/or how to prepare.    Pre assessment was not completed with prior scheduled procedure ([place]) Will need 2nd attempt if no call back.     Sarah Fournier, AMELIA  Endoscopy Procedure Pre Assessment RN

## 2024-05-14 NOTE — TELEPHONE ENCOUNTER
VM offering closer location for colonoscopy on same date, 5/23/24 @[facility]. 9 am w/Antonio on hold through [facility].

## 2024-05-16 NOTE — TELEPHONE ENCOUNTER
Second call attempt to complete pre assessment.     No answer.  Left message to return call to [phone] #[phone] by next business day prior to 4PM or procedure will be sent to cancel.     Callback required communication sent via [employer].      Kelly Bonilla, RN  Endoscopy Procedure Pre Assessment RN

## 2024-05-20 ENCOUNTER — TELEPHONE (OUTPATIENT)
Dept: GASTROENTEROLOGY | Facility: CLINIC | Age: 54
End: 2024-05-20
Payer: COMMERCIAL

## 2024-05-20 NOTE — TELEPHONE ENCOUNTER
"RN was able to connect with the endo scheduling team to get patient back on the scheduled at [facility] [place] 5.23.2024 at 0730.  Arrival time: 0645.    Pre assessment completed for upcoming procedure.   (Please see previous telephone encounter notes for complete details)    Patient  returned call.       Procedure details:    Arrival time and facility location reviewed.    Pre op exam needed? N/A    Designated  policy reviewed. Instructed to have someone stay 6  hours post procedure.       Medication review:    Medications reviewed. Please see supporting documentation below. Holding recommendations discussed (if applicable).       Prep for procedure:     Procedure prep instructions reviewed.        Any additional information needed:  Patient did eat some popcorn on Friday 5.17.2024, 6 days prior to the procedure.  RN encouraged patient to drink 4-6 large glasses of water/sports drink today and tomorrow per bowel prep instructions.  Ensure they are also drinking adequate amounts of clear liquids the day prior to procedure with bowel prep per instructions. Pt is aware bowel movements should be a \"clear yellow\" upon arrival.       Patient  verbalized understanding and had no questions or concerns at this time.      Marie Romeo RN  Endoscopy Procedure Pre Assessment RN  [phone] option 4  "

## 2024-05-20 NOTE — TELEPHONE ENCOUNTER
----- Message from Kelly Bonilla RN sent at 5/20/2024  7:18 AM CDT -----  Regarding: cancel  Please cancel colonoscopy on 5/23/24. Pt did not return our calls for PA. Thanks.     Kelly

## 2024-05-20 NOTE — TELEPHONE ENCOUNTER
Caller: No Call Made    Reason for Reschedule/Cancellation   (please be detailed, any staff messages or encounters to note?): Pt did not complete pre-assessment call      Prior to reschedule please review:  Ordering Provider: Toby Infante MD  Sedation Determined: Moderate  Does patient have any ASC Exclusions, please identify?: No      Notes on Cancelled Procedure:  Procedure: Lower Endoscopy [Colonoscopy]   Date: 05/23/2024  Location: [facility] [place]; [address] [place] [address], [address]nd Floor, [place] [address]   Surgeon: GALDINO      Rescheduled: No, CASE IN DEPOT       Did you cancel or rescheduled an EUS procedure? No.

## 2024-05-20 NOTE — TELEPHONE ENCOUNTER
Rescheduled: Yes,   Procedure: Lower Endoscopy [Colonoscopy]    Date: 5/23/24   Location: [facility] [place]; [address] Floor, [place] [address]    Surgeon: Ren   Sedation Level Scheduled  Moderate ,  Reason for Sedation Level Order   Instructions updated and sent: Porfirio     Does patient need PAC or Pre -Op Rescheduled? : No       Did you cancel or rescheduled an EUS procedure? No.    Patient added back to original schedule date, completing pre assessment

## 2024-05-20 NOTE — TELEPHONE ENCOUNTER
No return call for PA. Staff message sent to scheduling to cancel.     Kelly Bonilla, RN   Endoscopy Procedure Pre Assessment RN

## 2024-05-23 ENCOUNTER — HOSPITAL ENCOUNTER (OUTPATIENT)
Facility: AMBULATORY SURGERY CENTER | Age: 54
Discharge: HOME | End: 2024-05-23
Attending: INTERNAL MEDICINE | Admitting: INTERNAL MEDICINE
Payer: COMMERCIAL

## 2024-05-23 VITALS
OXYGEN SATURATION: 94 % | SYSTOLIC BLOOD PRESSURE: 107 MMHG | DIASTOLIC BLOOD PRESSURE: 64 MMHG | TEMPERATURE: 97.6 F | RESPIRATION RATE: 16 BRPM | HEART RATE: 64 BPM

## 2024-05-23 LAB — COLONOSCOPY: NORMAL

## 2024-05-23 PROCEDURE — 45378 DIAGNOSTIC COLONOSCOPY: CPT

## 2024-05-23 PROCEDURE — G8918 PT W/O PREOP ORDER IV AB PRO: HCPCS

## 2024-05-23 PROCEDURE — G8907 PT DOC NO EVENTS ON DISCHARG: HCPCS

## 2024-05-23 RX ORDER — NALOXONE HYDROCHLORIDE 0.4 MG/ML
0.4 INJECTION, SOLUTION INTRAMUSCULAR; INTRAVENOUS; SUBCUTANEOUS
Status: DISCONTINUED | OUTPATIENT
Start: 2024-05-23 | End: 2024-05-24 | Stop reason: HOSPADM

## 2024-05-23 RX ORDER — ONDANSETRON 2 MG/ML
4 INJECTION INTRAMUSCULAR; INTRAVENOUS EVERY 6 HOURS PRN
Status: DISCONTINUED | OUTPATIENT
Start: 2024-05-23 | End: 2024-05-24 | Stop reason: HOSPADM

## 2024-05-23 RX ORDER — PROCHLORPERAZINE MALEATE 10 MG
10 TABLET ORAL EVERY 6 HOURS PRN
Status: DISCONTINUED | OUTPATIENT
Start: 2024-05-23 | End: 2024-05-24 | Stop reason: HOSPADM

## 2024-05-23 RX ORDER — ONDANSETRON 2 MG/ML
4 INJECTION INTRAMUSCULAR; INTRAVENOUS
Status: DISCONTINUED | OUTPATIENT
Start: 2024-05-23 | End: 2024-05-24 | Stop reason: HOSPADM

## 2024-05-23 RX ORDER — ONDANSETRON 4 MG/1
4 TABLET, ORALLY DISINTEGRATING ORAL EVERY 6 HOURS PRN
Status: DISCONTINUED | OUTPATIENT
Start: 2024-05-23 | End: 2024-05-24 | Stop reason: HOSPADM

## 2024-05-23 RX ORDER — FENTANYL CITRATE 50 UG/ML
INJECTION, SOLUTION INTRAMUSCULAR; INTRAVENOUS PRN
Status: DISCONTINUED | OUTPATIENT
Start: 2024-05-23 | End: 2024-05-23 | Stop reason: HOSPADM

## 2024-05-23 RX ORDER — LIDOCAINE 40 MG/G
CREAM TOPICAL
Status: DISCONTINUED | OUTPATIENT
Start: 2024-05-23 | End: 2024-05-24 | Stop reason: HOSPADM

## 2024-05-23 RX ORDER — NALOXONE HYDROCHLORIDE 0.4 MG/ML
0.2 INJECTION, SOLUTION INTRAMUSCULAR; INTRAVENOUS; SUBCUTANEOUS
Status: DISCONTINUED | OUTPATIENT
Start: 2024-05-23 | End: 2024-05-24 | Stop reason: HOSPADM

## 2024-05-23 RX ORDER — FLUMAZENIL 0.1 MG/ML
0.2 INJECTION, SOLUTION INTRAVENOUS
Status: ACTIVE | OUTPATIENT
Start: 2024-05-23 | End: 2024-05-23

## 2024-05-23 NOTE — H&P
[facility] Anesthesia Pre-op History and Physical    Leonela Jaramillo MRN# [id number]   Age: 53 year old YOB: 1970            Date of Exam 5/23/2024         Primary care provider: No Ref-Primary, Physician         Chief Complaint and/or Reason for Procedure:     CRC screening - first colonoscopy         Active problem list:     Patient Active Problem List    Diagnosis Date Noted    Chronic eczema 06/10/2020     Priority: Medium    Anxiety 02/02/2018     Priority: Medium            Medications (include herbals and vitamins):   Any Plavix use in the last 7 days? No     Current Outpatient Medications   Medication Sig Dispense Refill    betamethasone dipropionate (DIPROSONE) 0.05 % external cream Apply topically 2 times daily 45 g 1    citalopram (CELEXA) 40 MG tablet Take 1 tablet (40 mg) by mouth daily 90 tablet 3    naproxen (NAPROSYN) 500 MG tablet Take 1 tablet (500 mg) by mouth daily as needed for moderate pain (with initial dose of sumatriptan, in case of migraine) 30 tablet 1    SUMAtriptan (IMITREX) 25 MG tablet Take 1 tablet (25 mg) by mouth at onset of headache for migraine May repeat in 2 hours. Max 8 tablets/24 hours. 30 tablet 1     Current Facility-Administered Medications   Medication Dose Route Frequency Provider Last Rate Last Admin    lidocaine (LMX4) kit   Topical Q1H PRN Ren, Meseret, DO        lidocaine 1 % 0.1-1 mL  0.1-1 mL Other Q1H PRN McBeath, Meseret, DO        ondansetron (ZOFRAN) injection 4 mg  4 mg Intravenous Once PRN Ren, Meseret, DO        sodium chloride (PF) 0.9% PF flush 3 mL  3 mL Intracatheter Q8H McBeath, Meseret, DO        sodium chloride (PF) 0.9% PF flush 3 mL  3 mL Intracatheter q1 min prn McBeath, Meseret, DO                 Allergies:      Allergies   Allergen Reactions    Vancomycin Hives     Allergy to Latex? No  Allergy to tape?   No  Intolerances:             Physical Exam:   All vitals have been reviewed  Patient Vitals for the past 8  hrs:   BP Temp Temp src Pulse Resp SpO2   05/23/24 0711 119/78 97.6  F (36.4  C) Temporal 68 18 97 %     No intake/output data recorded.  Lungs:   No increased work of breathing, good air exchange, clear to auscultation bilaterally, no crackles or wheezing     Cardiovascular:   normal S1 and S2             Lab / Radiology Results:            Anesthetic risk and/or ASA classification:     1  Meseret Mcclure DO

## 2024-05-23 NOTE — LETTER
[facility] [place]  [address]  [facility] [address]  [phone]          May 14, 2024    Leonela Jaramillo                                                                                                                     [address] [place] [id number]            Dear Leonela,      We apologize that we have been unable to contact you by phone. We are calling in regards to your upcoming Colonoscopy  procedure that is scheduled on 5.23.24 to complete pre assessment.    Please contact our pre assessment nursing team at [phone] option 4. We are open Monday through Friday, 7:00am to 5:00pm. Note that failure to complete Nurse assessment phone call will result in your procedure being cancelled.     Our schedules fill up quickly and are in high demand. If you know that you need to cancel, please contact us so that we can accommodate scheduling for other patients. Our endoscopy scheduling team can be reached at [phone] option 2.     Thank you,  [facility] Endoscopy Team                Colonoscopy      Procedure date: 5.23.24    Anticipated arrival time: 645 AM   (Please note that arrival times may change)    Facility location: [facility] [place]; [address] Floor, [place] [address] - Check in location: 2nd Floor at Surgery desk      Important Procedure Reminders:     Prep Instructions:   Instructions on how to prepare for your upcoming procedure are found below. Please read instructions carefully. Deviation from instructions may result in less than desired outcomes and procedure may need to be rescheduled.   If you have additional questions regarding how to prepare for your upcoming procedure, contact our endoscopy pre assessment nurses at [phone] option 4 Monday through Friday 7:00am-5:00pm     Policy:   The medications used during the procedure will make you sleepy, so you won't be able to drive. On the day of your procedure, please  have an adult ready to drive you home and stay with you for the next 6 hours.   You can't use public transportation, ride-share services, or non-medical taxi services without a responsible caregiver. Medical transport services are okay, but a caregiver must be there to receive you at your destination.   Make sure your  and caregiver are confirmed before your procedure.      Day of procedure:  Please keep in mind that arrival times may change. Let your  know there might be a one-hour window for changes.  We ask that you please check in at the  with your . Your  should remain on campus.  Expect to be at the procedure center for about 1.5-2.5 hours.    Please do not wear jewelry (i.e. earrings, rings, necklaces, watches, etc). Leave your purse, billfold, credit cards, and other valuables at home.   Bring insurance card and ID.     To cancel or reschedule your procedure:   Within 14 days of your procedure if you develop any flu-like symptoms (such as fever, cough, shortness of breath), COVID-19 like symptoms or exposure to COVID-19, contact our endoscopy team at [phone] option 4 to determine if procedure can be completed or needs to be delayed.   If you need to cancel or reschedule, our endoscopy scheduling team can be reached at [phone], option 2. Monday through Friday, 7:00am-5:00pm.      Medication Reminders:    Please note the following medication holding recommendations:   NSAID medication(s): Naproxen (Aleve, Naprosyn): HOLD 4 days before procedure.     ----------------------------------------------------------------------------------------------------------------------------------------------------------------------------------------------------------------------------------------------------------------------      Standard MiraLAX Bowel Prep  Prep Instructions for your Colonoscopy  Pre-Assessment Phone Number: [facility]; [phone] option  4    Please read these instructions carefully at least 7 days prior to your colonoscopy procedure. Be sure to follow all directions completely. The inside of your colon must be clean to allow for a complete examination for the presence of any growths, polyps, and/or abnormalities, as well as their biopsy or removal. A number of tips are included in order to make this part of the procedure as comfortable as possible.    Getting ready:   A nurse will call you to go over instructions and your health history.  It's important to complete the nurse assessment before your procedure. If you don't, your procedure might have to be canceled.  You must arrange for an adult to drive you home after your exam. Your colonoscopy cannot be done unless you have a ride. If you need to use public transportation, someone must ride with you and stay with you for a minimum of 24 hours.  Check with your insurance company to be sure they will cover this exam.  Go to the drug store and buy:  Four (4) - Dulcolax (Bisacodyl) 5mg tablets   8.3 ounce bottle of Miralax powder  64 ounces of Gatorade (not red or purple)     10 ounce bottle of clear magnesium citrate    7 days before the exam:  Talk to your prescribing provider: If you take blood thinners (such as Coumadin, Plavix, Xarelto, Eliquis, Lovenox, or others), these medications may need to be stopped temporarily before your procedure. Your prescribing provider will tell you what to do.   Talk to your prescribing provider: If you take prescription NSAIDS (such as Sulindac, Celebrex, Mobic, Relafen). Your prescribing provider will tell you what to do.   Stop taking fiber and iron supplements   Stop eating corn, popcorn, nuts and foods that contain seeds. These can stay in the colon for many days and they can clog up the colonoscope.     3 days before the exam:  Begin a low-fiber diet (see below).   Drink at least 4 to 6 large glasses of water or sports drink (not red or purple) each day.      One day before the exam:  Only clear liquid diet is allowed (see below). No solid food should be eaten.   Drink at least 8 to 10 full glasses of clear liquids during the day.   Stop taking NSAID pain relievers, such as Advil, Ibuprofen, Motrin, etc.  You may take Tylenol.  Note: You will start drinking the colonoscopy prep solution at 5 PM. The timing of second step depends on your appointment arrival time. See Steps 1-2 below.    Step One:  At 4 PM, take 2 Dulcolax (Bisacodyl) tablets.   At 4 PM, mix the entire bottle of Miralax with 64 ounces of Gatorade in a pitcher and stir to dissolve the powder. Chill if desired, but do not add ice.   At 5 PM, start drinking an 8-ounce glass of the Miralax and Gatorade mixture every 15 minutes until the pitcher is HALF empty (about 4 glasses).  Store the rest in the refrigerator.   Bowel movements usually begin about one hour after your finish this first dose of Miralax. The stool is likely to be brown at this stage.   After you start drinking the solution, stay near a toilet. You may have watery stools (diarrhea), mild cramping, bloating , and nausea.   You may want to use Vaseline on the skin around your anus after each bowel movement to prevent irritation. You can also use wet wipes to prevent irritation.  Continue to drink clear liquids.     At 10 PM, take 2 Dulcolax (Bisacodyl) tablets  At 10 PM start drinking an 8-ounce glass of Miralax and Gatorade mixture every 15 minutes until the pitcher is empty (about 4 glasses).       Step Two:   If you arrive for your procedure before 11 AM:    6 hours prior to your scheduled arrival to the endoscopy unit drink 10 ounces of clear Magnesium Citrate    If you arrive for your procedure after 11 AM:     At 6 AM on the day of the exam: drink 10 ounces of clear Magnesium Citrate        Day of exam:  You may drink clear liquids only up until 2 hours before your arrival time.  You may take your necessary morning medications with sips  of water  Do not take diabetes medicine by mouth until after your exam.  Do not smoke or swallow anything, including water or gum for at least 2 hours before your arrival time. This is a safety issue. Your procedure could be cancelled if you do not follow directions.  No chewing tobacco 6 hours prior to procedure arrival time.   Please do not wear jewelry (i.e. earrings, rings, necklaces, watches, etc) . Leave your purse, billfold, credit cards, and other valuables at home.    Please arrive with a responsible adult who can take you home after the test and stay with you for a minimum of 24 hours: The medicine used will make you sleepy and forgetful. If you do not have someone to take you home, we will cancel your procedure. If using public transportation you must have someone to ride with you.  Please perform your nebulizer treatments and airway clearance therapy in the morning prior to the procedure (if applicable).  If you have asthma, bring your inhalers.       CLEAR LIQUID DIET   You may have:    Water, tea, coffee (no milk or cream)  Soda pop, Gatorade (not red or purple)  Jell-O, Popsicles (no milk or fruit pieces - not red or purple)  Fat-free soup broth or bouillon  Plain hard candy, such as clear life savers (not red or purple)  Clear juices and fruit-flavored drinks, such as apple juice, white grape juice, Hi-C, and Luther-Aid (not red or purple)   Do not have:    Milk or milk products such as ice cream, malts or shakes, or coffee creamer  Red or purple drinks of any kind such as cranberry juice or grape juice. Avoid red or purple Jell-O, Popsicles, Luther-Aid, sorbet, sherbet and candy  Juices with pulp such as orange, grapefruit, pineapple or tomato juice  Cream soups of any kind  Alcohol and beer  Protein drinks or protein powder     LOW FIBER DIET   You may have:    Starches: White bread, rolls, biscuits, croissants, [place] toast, white flour tortillas, waffles, pancakes, Kazakh toast; white rice, noodles,  pasta, macaroni; cooked and peeled potatoes; plain crackers, saltines; cooked farina or cream of rice; puffed rice, corn flakes, Rice Krispies, Special K   Vegetables: tender cooked and canned, vegetable broths  Fruits and fruit juices: Strained fruit juice, canned fruit without seeds or skin (not pineapple), applesauce, pear sauce, ripe bananas, melons (not watermelon)   Milk products: Milk (plain or flavored), cheese, cottage cheese, yogurt (no berries), custard, ice cream    Proteins: Tender, well-cooked ground beef, lamb, veal, ham, pork, chicken, turkey, fish or organ meats; eggs; creamy peanut butter   Fats and condiments:  Margarine, butter, oils, mayonnaise, sour cream, salad dressing, plain gravy; spices, cooked herbs; sugar, clear jelly, honey, syrup   Snacks, sweets and drinks: Pretzels, hard candy; plain cakes and cookies (no nuts or seeds); gelatin, plain pudding, sherbet, Popsicles; coffee, tea, carbonated ( fizzy ) drinks Do not have:    Starches: Breads or rolls that contain nuts, seeds or fruit; whole wheat or whole grain breads that contain more than 1 gram of fiber per slice; cornbread; corn or whole wheat tortillas; potatoes with skin; brown rice, wild rice, kasha (buckwheat), and oatmeal   Vegetables: Any raw or steamed vegetables; vegetables with seeds; corn in any form   Fruits and fruit juices: Prunes, prune juice, raisins and other dried fruits, berries and other fruits with seeds, canned pineapple juices with pulp such as orange, grapefruit, pineapple or tomato juice  Milk products: Any yogurt with nuts, seeds or berries   Proteins: Tough, fibrous meats with gristle; cooked dried beans, peas or lentils; crunchy peanut butter  Fats and condiments: Pickles, olives, relish, horseradish; jam, marmalade, preserves   Snacks, sweets and drinks: Popcorn, nuts, seeds, granola, coconut, candies made with nuts or seeds; all desserts that contain nuts, seeds, raisins and other dried fruits, coconut,  whole grains or bran.      FAQ:    Why should Miralax be mixed with Gatorade or another clear sports drink?   It is important that your body does not develop an imbalance of electrolytes with the large volume of fluid in this prep. Gatorade/sports drinks contains those electrolytes.   Does Miralax have to be mixed with Gatorade?  Gatorade, Powerade, or any of the other sports drinks are acceptable. If you are diabetic, it is acceptable to use the low sugar options, such as Gatorade Zero, Powerade Zero, G2, etc.  Can I put ice in the Gatorade/Miralax mixture?  We prefer that you mix it and put it in the refrigerator to chill instead of using ice. The ice will melt and dilute the laxative.    Why should the Miralax prep be taken in several steps?   The stool is flushed out by a large wave of fluid going through the colon. Just sipping a large volume of the solution will not achieve the desired result. Studies have shown that two smaller waves (or more in some cases) are better than one large one.    Why are the second Miralax dose and Magnesium Citrate so close to the exam?   The intestine continues to produce mucus and waste. Longer intervals between the prep and the exam can lead to less than desired results. However, the stomach must be empty at the time of the exam in order to allow safe sedation. Therefore, there should be nothing by mouth 2 hours before the exam is started.   How do you know if your colon is cleaned out?   After completing the bowel prep, your bowel movements should be all liquid and yellow. Your bowel movements will look similar to urine in the toilet. If there are pieces of stool (poop) in the toilet, or if you can't see to the bottom of the toilet, please call our office for advice. Call [phone] and ask to speak with a nurse.   Why do you need a responsible  to take you home and stay with you?  We require a responsible adult to take you home for your safety. The sedation medicines  used to relax you during the procedure can impair your judgement and reaction time, and make you forgetful and possibly a little unsteady. Do not drive, make any important decisions, or sign any legal documents for 24 hours after your procedure.   It is normal to feel bloated and gassy after your procedure. Walking will help move the air through your colon. You can take non-aspirin pain relievers that contain acetaminophen (Tylenol).     When can you eat after your procedure?  You may resume your normal diet when you feel ready, unless advised otherwise by the doctor performing your procedure. Do not drink alcohol for 24 hours after your procedure.   You many resume normal activities (work, exercise, etc.) after 24 hours.     When will you get test results?  You should have your procedure results and any lab results (if applicable) by letter, MyChart message, or phone call within 2 weeks. If you have any questions, please call the doctor that referred you for the procedure.         Updated: 06/22/2022

## 2024-08-05 DIAGNOSIS — F41.1 GAD (GENERALIZED ANXIETY DISORDER): ICD-10-CM

## 2024-08-05 RX ORDER — CITALOPRAM HYDROBROMIDE 40 MG/1
40 TABLET ORAL DAILY
Qty: 90 TABLET | Refills: 1 | Status: SHIPPED | OUTPATIENT
Start: 2024-08-05

## 2025-01-19 ENCOUNTER — HEALTH MAINTENANCE LETTER (OUTPATIENT)
Age: 55
End: 2025-01-19

## 2025-03-26 ENCOUNTER — OFFICE VISIT (OUTPATIENT)
Dept: URGENT CARE | Facility: URGENT CARE | Age: 55
End: 2025-03-26
Payer: COMMERCIAL

## 2025-03-26 ENCOUNTER — ANCILLARY PROCEDURE (OUTPATIENT)
Dept: GENERAL RADIOLOGY | Facility: CLINIC | Age: 55
End: 2025-03-26
Attending: PHYSICIAN ASSISTANT
Payer: COMMERCIAL

## 2025-03-26 VITALS
OXYGEN SATURATION: 100 % | SYSTOLIC BLOOD PRESSURE: 126 MMHG | TEMPERATURE: 98.3 F | DIASTOLIC BLOOD PRESSURE: 78 MMHG | HEART RATE: 68 BPM | RESPIRATION RATE: 16 BRPM

## 2025-03-26 DIAGNOSIS — M54.50 ACUTE LOW BACK PAIN WITHOUT SCIATICA, UNSPECIFIED BACK PAIN LATERALITY: ICD-10-CM

## 2025-03-26 DIAGNOSIS — S06.0X0A CONCUSSION WITHOUT LOSS OF CONSCIOUSNESS, INITIAL ENCOUNTER: ICD-10-CM

## 2025-03-26 DIAGNOSIS — S61.214A LACERATION OF RIGHT RING FINGER WITHOUT FOREIGN BODY WITHOUT DAMAGE TO NAIL, INITIAL ENCOUNTER: ICD-10-CM

## 2025-03-26 DIAGNOSIS — R07.89 CHEST WALL PAIN: ICD-10-CM
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
## 2025-03-26 PROCEDURE — 99213 OFFICE O/P EST LOW 20 MIN: CPT | Performed by: PHYSICIAN ASSISTANT

## 2025-03-26 PROCEDURE — 72100 X-RAY EXAM L-S SPINE 2/3 VWS: CPT | Mod: TC | Performed by: INTERNAL MEDICINE

## 2025-03-26 RX ORDER — NAPROXEN 500 MG/1
500 TABLET ORAL 2 TIMES DAILY WITH MEALS
Qty: 30 TABLET | Refills: 0 | Status: SHIPPED | OUTPATIENT
Start: 2025-03-26

## 2025-03-26 RX ORDER — METHOCARBAMOL 500 MG/1
500-1000 TABLET, FILM COATED ORAL 4 TIMES DAILY
Qty: 40 TABLET | Refills: 0 | Status: SHIPPED | OUTPATIENT
Start: 2025-03-26
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
## 2025-03-26 NOTE — PATIENT INSTRUCTIONS
Please follow up with your regular doctor in 1 weeks for recheck of your symptoms.      Avoid activities that make the HA, neck pain, worse.    Cognitive rest.      Ice, heat, and tylenol 1000 mg 4 x per day may be helpful.

## (undated) DEVICE — PREP CHLORAPREP 26ML TINTED ORANGE  260815

## (undated) DEVICE — KIT ENDO FIRST STEP DISINFECTANT 200ML W/POUCH EP-4

## (undated) DEVICE — PAD CHUX UNDERPAD 23X24" 7136

## (undated) RX ORDER — FENTANYL CITRATE 50 UG/ML
INJECTION, SOLUTION INTRAMUSCULAR; INTRAVENOUS
Status: DISPENSED
Start: 2024-05-23